# Patient Record
Sex: MALE | Race: WHITE | Employment: OTHER | URBAN - METROPOLITAN AREA
[De-identification: names, ages, dates, MRNs, and addresses within clinical notes are randomized per-mention and may not be internally consistent; named-entity substitution may affect disease eponyms.]

---

## 2018-05-19 ENCOUNTER — HOSPITAL ENCOUNTER (EMERGENCY)
Age: 38
Discharge: HOME | End: 2018-05-20
Attending: EMERGENCY MEDICINE
Payer: MEDICARE

## 2018-05-19 DIAGNOSIS — G89.29 OTHER CHRONIC PAIN: Primary | ICD-10-CM

## 2018-05-19 PROCEDURE — 99284 EMERGENCY DEPT VISIT MOD MDM: CPT | Performed by: EMERGENCY MEDICINE

## 2018-05-19 RX ORDER — TRAMADOL HYDROCHLORIDE 50 MG/1
50 TABLET ORAL
COMMUNITY
End: 2020-03-11

## 2018-05-19 RX ORDER — GABAPENTIN 600 MG/1
600 TABLET ORAL 3 TIMES DAILY
COMMUNITY
End: 2020-01-24 | Stop reason: ALTCHOICE

## 2018-05-19 RX ORDER — DULOXETIN HYDROCHLORIDE 60 MG/1
60 CAPSULE, DELAYED RELEASE ORAL 2 TIMES DAILY
COMMUNITY
End: 2020-01-24 | Stop reason: ALTCHOICE

## 2018-05-19 RX ORDER — METHOCARBAMOL 500 MG/1
500 TABLET, FILM COATED ORAL 3 TIMES DAILY
COMMUNITY
End: 2020-01-24 | Stop reason: ALTCHOICE

## 2018-05-20 VITALS
TEMPERATURE: 98 F | RESPIRATION RATE: 18 BRPM | HEART RATE: 67 BPM | OXYGEN SATURATION: 98 % | DIASTOLIC BLOOD PRESSURE: 87 MMHG | SYSTOLIC BLOOD PRESSURE: 133 MMHG

## 2018-05-20 PROCEDURE — 74011250637 HC RX REV CODE- 250/637: Performed by: EMERGENCY MEDICINE

## 2018-05-20 RX ORDER — HYDROCODONE BITARTRATE AND ACETAMINOPHEN 7.5; 325 MG/1; MG/1
TABLET ORAL
Status: DISCONTINUED
Start: 2018-05-20 | End: 2018-05-20 | Stop reason: HOSPADM

## 2018-05-20 RX ORDER — HYDROCODONE BITARTRATE AND ACETAMINOPHEN 7.5; 325 MG/1; MG/1
1 TABLET ORAL
Qty: 10 TAB | Refills: 0 | Status: SHIPPED | OUTPATIENT
Start: 2018-05-20 | End: 2020-01-24 | Stop reason: ALTCHOICE

## 2018-05-20 RX ORDER — HYDROCODONE BITARTRATE AND ACETAMINOPHEN 7.5; 325 MG/1; MG/1
1 TABLET ORAL
Status: COMPLETED | OUTPATIENT
Start: 2018-05-20 | End: 2018-05-20

## 2018-05-20 RX ADMIN — HYDROCODONE BITARTRATE AND ACETAMINOPHEN 1 TABLET: 7.5; 325 TABLET ORAL at 01:25
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
## 2018-05-20 NOTE — ED TRIAGE NOTES
Pt to er c/o having back pain. .. sts he has had back pain and has seen his surgeon and not called his surgeon since his appt. ...  Pt has brace on back

## 2018-05-20 NOTE — DISCHARGE INSTRUCTIONS
Chronic Pain: Care Instructions  Your Care Instructions    Chronic pain is pain that lasts a long time (months or even years) and may or may not have a clear cause. It is different from acute pain, which usually does have a clear cause-like an injury or illness-and gets better over time. Chronic pain:  · Lasts over time but may vary from day to day. · Does not go away despite efforts to end it. · May disrupt your sleep and lead to fatigue. · May cause depression or anxiety. · May make your muscles tense, causing more pain. · Can disrupt your work, hobbies, home life, and relationships with friends and family. Chronic pain is a very real condition. It is not just in your head. Treatment can help and usually includes several methods used together, such as medicines, physical therapy, exercise, and other treatments. Learning how to relax and changing negative thought patterns can also help you cope. Chronic pain is complex. Taking an active role in your treatment will help you better manage your pain. Tell your doctor if you have trouble dealing with your pain. You may have to try several things before you find what works best for you. Follow-up care is a key part of your treatment and safety. Be sure to make and go to all appointments, and call your doctor if you are having problems. It's also a good idea to know your test results and keep a list of the medicines you take. How can you care for yourself at home? · Pace yourself. Break up large jobs into smaller tasks. Save harder tasks for days when you have less pain, or go back and forth between hard tasks and easier ones. Take rest breaks. · Relax, and reduce stress. Relaxation techniques such as deep breathing or meditation can help. · Keep moving. Gentle, daily exercise can help reduce pain over the long run. Try low- or no-impact exercises such as walking, swimming, and stationary biking. Do stretches to stay flexible.   · Try heat, cold packs, and massage. · Get enough sleep. Chronic pain can make you tired and drain your energy. Talk with your doctor if you have trouble sleeping because of pain. · Think positive. Your thoughts can affect your pain level. Do things that you enjoy to distract yourself when you have pain instead of focusing on the pain. See a movie, read a book, listen to music, or spend time with a friend. · If you think you are depressed, talk to your doctor about treatment. · Keep a daily pain diary. Record how your moods, thoughts, sleep patterns, activities, and medicine affect your pain. You may find that your pain is worse during or after certain activities or when you are feeling a certain emotion. Having a record of your pain can help you and your doctor find the best ways to treat your pain. · Take pain medicines exactly as directed. ¨ If the doctor gave you a prescription medicine for pain, take it as prescribed. ¨ If you are not taking a prescription pain medicine, ask your doctor if you can take an over-the-counter medicine. Reducing constipation caused by pain medicine  · Include fruits, vegetables, beans, and whole grains in your diet each day. These foods are high in fiber. · Drink plenty of fluids, enough so that your urine is light yellow or clear like water. If you have kidney, heart, or liver disease and have to limit fluids, talk with your doctor before you increase the amount of fluids you drink. · If your doctor recommends it, get more exercise. Walking is a good choice. Bit by bit, increase the amount you walk every day. Try for at least 30 minutes on most days of the week. · Schedule time each day for a bowel movement. A daily routine may help. Take your time and do not strain when having a bowel movement. When should you call for help? Call your doctor now or seek immediate medical care if:  ? · Your pain gets worse or is out of control.    ? · You feel down or blue, or you do not enjoy things like you once did. You may be depressed, which is common in people with chronic pain. Depression can be treated. ? · You have vomiting or cramps for more than 2 hours. ? Watch closely for changes in your health, and be sure to contact your doctor if:  ? · You cannot sleep because of pain. ? · You are very worried or anxious about your pain. ? · You have trouble taking your pain medicine. ? · You have any concerns about your pain medicine. ? · You have trouble with bowel movements, such as:  ¨ No bowel movement in 3 days. ¨ Blood in the anal area, in your stool, or on the toilet paper. ¨ Diarrhea for more than 24 hours. Where can you learn more? Go to http://oleg-trupti.info/. Enter N004 in the search box to learn more about \"Chronic Pain: Care Instructions. \"  Current as of: October 14, 2016  Content Version: 11.4  © 4047-2138 [employer]. Care instructions adapted under license by [employer] (which disclaims liability or warranty for this information). If you have questions about a medical condition or this instruction, always ask your healthcare professional. [place] [address] any warranty or liability for your use of this information.

## 2018-05-20 NOTE — ED NOTES
Received report. Pt laying in bed. A&OX4. NAD noted. C/o back and neck pain. Denies cp or sob. Family at bedside.

## 2018-05-20 NOTE — ED NOTES
I have reviewed discharge instructions with the patient. The patient verbalized understanding. Patient left ED via Discharge Method: ambulatory to Home with mother. Opportunity for questions and clarification provided. Patient given 1 scripts. To continue your aftercare when you leave the hospital, you may receive an automated call from our care team to check in on how you are doing. This is a free service and part of our promise to provide the best care and service to meet your aftercare needs.  If you have questions, or wish to unsubscribe from this service please call [phone]. Thank you for Choosing our [facility] Emergency Department.

## 2020-01-24 PROBLEM — L81.9 DISCOLORATION OF SKIN: Status: ACTIVE | Noted: 2020-01-24

## 2020-01-24 PROBLEM — B35.4 TINEA CORPORIS: Status: ACTIVE | Noted: 2020-01-24

## 2020-01-24 PROBLEM — E66.9 CLASS 1 OBESITY WITH BODY MASS INDEX (BMI) OF 34.0 TO 34.9 IN ADULT: Status: ACTIVE | Noted: 2020-01-24

## 2020-01-24 PROBLEM — G44.329 CHRONIC POST-TRAUMATIC HEADACHE, NOT INTRACTABLE: Status: ACTIVE | Noted: 2020-01-24

## 2020-02-07 PROBLEM — K59.09 OTHER CONSTIPATION: Status: ACTIVE | Noted: 2020-02-07

## 2020-02-07 PROBLEM — E66.01 SEVERE OBESITY (HCC): Status: ACTIVE | Noted: 2020-02-07

## 2020-02-17 ENCOUNTER — HOSPITAL ENCOUNTER (OUTPATIENT)
Dept: NUTRITION | Age: 40
Discharge: HOME | End: 2020-02-17
Payer: MEDICARE

## 2020-02-17 PROCEDURE — 97802 MEDICAL NUTRITION INDIV IN: CPT
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
## 2020-03-02 ENCOUNTER — DOCUMENTATION ONLY (OUTPATIENT)
Dept: NUTRITION | Age: 40
End: 2020-03-02

## 2020-03-02 NOTE — PROGRESS NOTES
Nutrition Counseling:  Pt referred by Dr. Nelson Babcock. Pt did not show for follow-up appt today.     Pradeep Sherman Morales [address], [address] [place] [address], [address] [facility], [address] [place] [address]

## 2020-03-11 PROBLEM — Z72.0 SMOKING TRYING TO QUIT: Status: ACTIVE | Noted: 2020-03-11

## 2020-03-11 PROBLEM — K59.00 CONSTIPATION: Status: ACTIVE | Noted: 2020-02-07

## 2020-03-11 PROBLEM — Z77.090 HISTORY OF EXPOSURE TO ASBESTOS: Status: ACTIVE | Noted: 2020-03-11

## 2020-04-20 ENCOUNTER — HOSPITAL ENCOUNTER (OUTPATIENT)
Dept: GENERAL RADIOLOGY | Age: 40
Discharge: HOME | End: 2020-04-20
Attending: INTERNAL MEDICINE
Payer: MEDICARE

## 2020-04-20 DIAGNOSIS — Z77.090 HX OF ASBESTOS EXPOSURE: ICD-10-CM

## 2020-04-20 PROBLEM — Z87.891 HISTORY OF PRIOR CIGARETTE SMOKING: Status: ACTIVE | Noted: 2020-03-11

## 2020-04-20 PROBLEM — R06.02 SHORTNESS OF BREATH: Status: ACTIVE | Noted: 2020-04-20

## 2020-04-20 PROCEDURE — 71046 X-RAY EXAM CHEST 2 VIEWS: CPT

## 2020-04-21 ENCOUNTER — HOSPITAL ENCOUNTER (EMERGENCY)
Age: 40
Discharge: HOME | End: 2020-04-21
Attending: EMERGENCY MEDICINE
Payer: MEDICARE

## 2020-04-21 ENCOUNTER — APPOINTMENT (OUTPATIENT)
Dept: CT IMAGING | Age: 40
End: 2020-04-21
Attending: NURSE PRACTITIONER
Payer: MEDICARE

## 2020-04-21 VITALS
RESPIRATION RATE: 18 BRPM | OXYGEN SATURATION: 95 % | TEMPERATURE: 98.2 F | SYSTOLIC BLOOD PRESSURE: 135 MMHG | HEART RATE: 77 BPM | DIASTOLIC BLOOD PRESSURE: 81 MMHG

## 2020-04-21 DIAGNOSIS — S05.02XA CORNEAL ABRASION, LEFT, INITIAL ENCOUNTER: Primary | ICD-10-CM

## 2020-04-21 LAB
ANION GAP SERPL CALC-SCNC: 6 MMOL/L (ref 7–16)
BASOPHILS # BLD: 0.1 K/UL (ref 0–0.2)
BASOPHILS NFR BLD: 1 % (ref 0–2)
BUN SERPL-MCNC: 10 MG/DL (ref 6–23)
CALCIUM SERPL-MCNC: 9.5 MG/DL (ref 8.3–10.4)
CHLORIDE SERPL-SCNC: 107 MMOL/L (ref 98–107)
CO2 SERPL-SCNC: 30 MMOL/L (ref 21–32)
CREAT SERPL-MCNC: 0.85 MG/DL (ref 0.8–1.5)
DIFFERENTIAL METHOD BLD: NORMAL
EOSINOPHIL # BLD: 0.2 K/UL (ref 0–0.8)
EOSINOPHIL NFR BLD: 4 % (ref 0.5–7.8)
ERYTHROCYTE [DISTWIDTH] IN BLOOD BY AUTOMATED COUNT: 12.9 % (ref 11.9–14.6)
GLUCOSE SERPL-MCNC: 112 MG/DL (ref 65–100)
HCT VFR BLD AUTO: 45 % (ref 41.1–50.3)
HGB BLD-MCNC: 15 G/DL (ref 13.6–17.2)
IMM GRANULOCYTES # BLD AUTO: 0.1 K/UL (ref 0–0.5)
IMM GRANULOCYTES NFR BLD AUTO: 1 % (ref 0–5)
LYMPHOCYTES # BLD: 2 K/UL (ref 0.5–4.6)
LYMPHOCYTES NFR BLD: 31 % (ref 13–44)
MCH RBC QN AUTO: 29.5 PG (ref 26.1–32.9)
MCHC RBC AUTO-ENTMCNC: 33.3 G/DL (ref 31.4–35)
MCV RBC AUTO: 88.4 FL (ref 79.6–97.8)
MONOCYTES # BLD: 0.4 K/UL (ref 0.1–1.3)
MONOCYTES NFR BLD: 6 % (ref 4–12)
NEUTS SEG # BLD: 3.8 K/UL (ref 1.7–8.2)
NEUTS SEG NFR BLD: 58 % (ref 43–78)
NRBC # BLD: 0 K/UL (ref 0–0.2)
PLATELET # BLD AUTO: 292 K/UL (ref 150–450)
PMV BLD AUTO: 10.2 FL (ref 9.4–12.3)
POTASSIUM SERPL-SCNC: 3.9 MMOL/L (ref 3.5–5.1)
RBC # BLD AUTO: 5.09 M/UL (ref 4.23–5.6)
SODIUM SERPL-SCNC: 143 MMOL/L (ref 136–145)
WBC # BLD AUTO: 6.6 K/UL (ref 4.3–11.1)

## 2020-04-21 PROCEDURE — 85025 COMPLETE CBC W/AUTO DIFF WBC: CPT

## 2020-04-21 PROCEDURE — 70487 CT MAXILLOFACIAL W/DYE: CPT

## 2020-04-21 PROCEDURE — 99283 EMERGENCY DEPT VISIT LOW MDM: CPT

## 2020-04-21 PROCEDURE — 74011250637 HC RX REV CODE- 250/637: Performed by: NURSE PRACTITIONER

## 2020-04-21 PROCEDURE — 74011000250 HC RX REV CODE- 250: Performed by: NURSE PRACTITIONER

## 2020-04-21 PROCEDURE — 80048 BASIC METABOLIC PNL TOTAL CA: CPT

## 2020-04-21 PROCEDURE — 74011000258 HC RX REV CODE- 258: Performed by: EMERGENCY MEDICINE

## 2020-04-21 PROCEDURE — 74011636320 HC RX REV CODE- 636/320: Performed by: EMERGENCY MEDICINE

## 2020-04-21 RX ORDER — ERYTHROMYCIN 5 MG/G
OINTMENT OPHTHALMIC
Status: COMPLETED | OUTPATIENT
Start: 2020-04-21 | End: 2020-04-21

## 2020-04-21 RX ORDER — TETRACAINE HYDROCHLORIDE 5 MG/ML
1 SOLUTION OPHTHALMIC
Status: COMPLETED | OUTPATIENT
Start: 2020-04-21 | End: 2020-04-21

## 2020-04-21 RX ORDER — SODIUM CHLORIDE 0.9 % (FLUSH) 0.9 %
10 SYRINGE (ML) INJECTION
Status: COMPLETED | OUTPATIENT
Start: 2020-04-21 | End: 2020-04-21

## 2020-04-21 RX ORDER — ERYTHROMYCIN 5 MG/G
OINTMENT OPHTHALMIC
Qty: 1 TUBE | Refills: 0 | Status: SHIPPED | OUTPATIENT
Start: 2020-04-21 | End: 2020-04-28

## 2020-04-21 RX ADMIN — FLUORESCEIN SODIUM 1 STRIP: 1 STRIP OPHTHALMIC at 12:14

## 2020-04-21 RX ADMIN — TETRACAINE HYDROCHLORIDE 1 DROP: 5 SOLUTION OPHTHALMIC at 12:14

## 2020-04-21 RX ADMIN — IOPAMIDOL 100 ML: 755 INJECTION, SOLUTION INTRAVENOUS at 14:11

## 2020-04-21 RX ADMIN — SODIUM CHLORIDE 100 ML: 900 INJECTION, SOLUTION INTRAVENOUS at 14:11

## 2020-04-21 RX ADMIN — Medication 10 ML: at 14:11

## 2020-04-21 RX ADMIN — ERYTHROMYCIN: 5 OINTMENT OPHTHALMIC at 15:18

## 2020-04-21 NOTE — ED PROVIDER NOTES
37 y/o m to ed with left eye pain since cutting the grass with push mower four days ago. Thinks he got something in it then. Has had tearing since that time. 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 However redness noted is new. Last tet prior to hand/wrist surgery (wearing splint now)           Past Medical History:   Diagnosis Date    Allergies     Anxiety     Bipolar disorder ([facility] [address]     Depression     Genital warts     Psychiatric disorder     Skin abrasion     Traumatic brain injury ([facility] [address]        Past Surgical History:   Procedure Laterality Date    HX BACK SURGERY  2017    HX CRANIOTOMY      HX KNEE ARTHROSCOPY Right     HX ORTHOPAEDIC  2017    arm         Family History:   Problem Relation Age of Onset    Heart Disease Mother     Diabetes Mother     Hypertension Mother     Diabetes Father     Stroke Father        Social History     Socioeconomic History    Marital status: SINGLE     Spouse name: Not on file    Number of children: Not on file    Years of education: Not on file    Highest education level: Not on file   Occupational History    Not on file   Social Needs    Financial resource strain: Not on file    Food insecurity     Worry: Not on file     Inability: Not on file    Transportation needs     Medical: Not on file     Non-medical: Not on file   Tobacco Use    Smoking status: Former Smoker     Packs/day: 1.50     Years: 20.00     Pack years: 30.00     Types: Cigarettes     Last attempt to quit: 2018     Years since quittin.3    Smokeless tobacco: Former User     Types: Snuff, Chew   Substance and Sexual Activity    Alcohol use:  Yes    Drug use: No    Sexual activity: Not on file   Lifestyle    Physical activity     Days per week: Not on file     Minutes per session: Not on file    Stress: Not on file   Relationships    Social connections     Talks on phone: Not on file     Gets together: Not on file     Attends Jain service: Not on file     Active member of club or organization: Not on file     Attends meetings of clubs or organizations: Not on file     Relationship status: Not on file    Intimate partner violence     Fear of current or ex partner: Not on file     Emotionally abused: Not on file     Physically abused: Not on file     Forced sexual activity: Not on file   Other Topics Concern    Not on file   Social History Narrative    Not on file         ALLERGIES: Amoxicillin; Benztropine; Haldol [haloperidol lactate]; Seroquel [quetiapine]; and Risperidone    Review of Systems   Constitutional: Negative for chills and fever. HENT: Negative for facial swelling and mouth sores. Eyes: Positive for pain, discharge and redness. Respiratory: Negative for cough and shortness of breath. Cardiovascular: Negative for chest pain and palpitations. Gastrointestinal: Negative for nausea and vomiting. Genitourinary: Negative for difficulty urinating. Musculoskeletal: Negative for back pain and neck pain. Skin: Negative for color change and rash. Neurological: Negative for dizziness and weakness. Psychiatric/Behavioral: Negative for confusion and decreased concentration. Vitals:    04/21/20 1130   BP: 126/86   Pulse: 71   Resp: 16   Temp: 98.2 °F (36.8 °C)   SpO2: 95%            Physical Exam  Vitals signs and nursing note reviewed. Constitutional:       Appearance: Normal appearance. HENT:      Head: Normocephalic and atraumatic. Nose: Nose normal.      Mouth/Throat:      Mouth: Mucous membranes are moist.   Eyes:      General: Lids are normal.      Extraocular Movements: Extraocular movements intact. Conjunctiva/sclera:      Left eye: Left conjunctiva is injected. Hemorrhage present. Neck:      Musculoskeletal: Normal range of motion. Cardiovascular:      Rate and Rhythm: Normal rate. Pulmonary:      Effort: Pulmonary effort is normal.   Musculoskeletal: Normal range of motion.    Skin:     General: Skin is warm. Capillary Refill: Capillary refill takes less than 2 seconds. Neurological:      General: No focal deficit present. Mental Status: He is alert and oriented to person, place, and time. Psychiatric:         Mood and Affect: Mood normal.         Behavior: Behavior normal.         Thought Content: Thought content normal.         Judgment: Judgment normal.          MDM  Number of Diagnoses or Management Options  Diagnosis management comments: Will eval fluro stain, as well ct for fb.    1:42 PM  Still waiting ct scan  2:04 PM) pt not in room, I hope he is in ct  2:36 PM  Back from ct. Uptake of stain noted to cloudy cornea (hsx same) with tiny abrasion noted at 6 oclock. Will wait ct result       Amount and/or Complexity of Data Reviewed  Clinical lab tests: ordered  Tests in the radiology section of CPT®: ordered           Eye Stain    Date/Time: 4/21/2020 2:35 PM    Performed by: NP  Supervising provider: rekito        Corneal abrasion was present on eyelid eversion. Left eye location: 6 o'clock    Cornea is not clear. Anterior chamber is not clear. Patient tolerance: Patient tolerated the procedure well with no immediate complications  My total time at bedside, performing this procedure was 1-15 minutes.   Comments: hsx cloudy cornea  Tiny linear abrasion

## 2020-04-21 NOTE — ED NOTES
I have reviewed discharge instructions with the patient. The patient verbalized understanding. Patient left ED via Discharge Method: ambulatory to Home with self    Opportunity for questions and clarification provided. Patient given 1 scripts. No esign        To continue your aftercare when you leave the hospital, you may receive an automated call from our care team to check in on how you are doing. This is a free service and part of our promise to provide the best care and service to meet your aftercare needs.  If you have questions, or wish to unsubscribe from this service please call [phone]. Thank you for Choosing our [facility].

## 2020-04-21 NOTE — ED NOTES
Pt resting in bed, pt denies any additional needs at this time. Pt respirations even and unlabored. Pt bed locked and low. Pt updated about plan of care, aware he is awaiting a CT.

## 2020-04-21 NOTE — ED TRIAGE NOTES
Pt reprots excessive watering to let eye for a couple of days. Previous hx of eye injury which caused blindness.  NAD

## 2020-04-21 NOTE — DISCHARGE INSTRUCTIONS
Patient Education        Corneal Scratches: Care Instructions  Your Care Instructions    The cornea is the clear surface that covers the front of the eye. When a speck of dirt, a wood chip, an insect, or another object flies into your eye, it can cause a painful scratch on the cornea. Wearing contact lenses too long or rubbing your eyes can also scratch the cornea. Small scratches usually heal in a day or two. Deeper scratches may take longer. If you have had a foreign object removed from your eye or you have a corneal scratch, you will need to watch for infection and vision problems while your eye heals. Follow-up care is a key part of your treatment and safety. Be sure to make and go to all appointments, and call your doctor if you are having problems. It's also a good idea to know your test results and keep a list of the medicines you take. How can you care for yourself at home? · The doctor probably used a medicine during your exam to numb your eye. When it wears off in 30 to 60 minutes, your eye pain may come back. Take pain medicines exactly as directed. ? If the doctor gave you a prescription medicine for pain, take it as prescribed. ? If you are not taking a prescription pain medicine, ask your doctor if you can take an over-the-counter medicine. ? Do not take two or more pain medicines at the same time unless the doctor told you to. Many pain medicines have acetaminophen, which is Tylenol. Too much acetaminophen (Tylenol) can be harmful. · Do not rub your injured eye. Rubbing can make it worse. · Use the prescribed eyedrops or ointment as directed. Be sure the dropper or bottle tip is clean. To put in eyedrops or ointment:  ? Tilt your head back, and pull your lower eyelid down with one finger. ? Drop or squirt the medicine inside the lower lid. ? Close your eye for 30 to 60 seconds to let the drops or ointment move around.   ? Do not touch the ointment or dropper tip to your eyelashes or any other surface. · Do not use your contact lens in your hurt eye until your doctor says you can. Also, do not wear eye makeup until your eye has healed. · Do not drive if you have blurred vision. · Bright light may hurt. Sunglasses can help. · To prevent eye injuries in the future, wear safety glasses or goggles when you work with machines or tools, mow the lawn, or ride a bike or motorcycle. When should you call for help? Call your doctor now or seek immediate medical care if:    · You have signs of an eye infection, such as:  ? Pus or thick discharge coming from the eye.  ? Redness or swelling around the eye.  ? A fever.     · You have new or worse eye pain.     · You have vision changes.     · It feels like there is something in your eye.     · Light hurts your eye.    Watch closely for changes in your health, and be sure to contact your doctor if:    · You do not get better as expected. Where can you learn more? Go to http://oleg-trupti.info/  Enter G403 in the search box to learn more about \"Corneal Scratches: Care Instructions. \"  Current as of: December 17, 2019Content Version: 12.4  © 1025-2857 [employer]. Care instructions adapted under license by [employer] (which disclaims liability or warranty for this information). If you have questions about a medical condition or this instruction, always ask your healthcare professional. [place] [address] any warranty or liability for your use of this information. home with family    Use meds 4 times daily.   Follow with your eye doctor for continued care

## 2020-05-08 PROBLEM — R06.09 DOE (DYSPNEA ON EXERTION): Status: ACTIVE | Noted: 2020-04-20

## 2020-05-11 PROBLEM — R20.2 PARESTHESIA: Status: ACTIVE | Noted: 2020-05-11

## 2020-05-11 PROBLEM — Z79.899 ENCOUNTER FOR MEDICATION MANAGEMENT: Status: ACTIVE | Noted: 2020-05-11

## 2020-05-11 PROBLEM — R93.0: Status: ACTIVE | Noted: 2020-05-11

## 2020-05-11 PROBLEM — G44.041 INTRACTABLE CHRONIC PAROXYSMAL HEMICRANIA: Status: ACTIVE | Noted: 2020-05-11

## 2020-05-11 PROBLEM — Z98.890 STATUS POST CRANIOTOMY: Status: ACTIVE | Noted: 2020-05-11

## 2020-05-11 PROBLEM — R29.3 POSTURAL IMBALANCE: Status: ACTIVE | Noted: 2020-05-11

## 2020-05-11 PROBLEM — G43.119 INTRACTABLE MIGRAINE WITH AURA WITHOUT STATUS MIGRAINOSUS: Status: ACTIVE | Noted: 2020-05-11

## 2020-06-16 ENCOUNTER — TELEPHONE (OUTPATIENT)
Dept: NUTRITION | Age: 40
End: 2020-06-16

## 2020-06-16 NOTE — TELEPHONE ENCOUNTER
Nutrition Counseling: Contacted pt regarding referral. See notes documented in Nutrition Counseling Referral for details. No further follow-up contact from pt. Will close referral for this office.     [facility]  [phone]

## 2020-09-03 PROBLEM — G56.03 BILATERAL CARPAL TUNNEL SYNDROME: Status: ACTIVE | Noted: 2020-09-03

## 2020-10-22 PROBLEM — G44.209 TENSION TYPE HEADACHE: Status: ACTIVE | Noted: 2020-10-22

## 2020-10-22 PROBLEM — Z87.828 HISTORY OF SUBDURAL HEMATOMA (POST TRAUMATIC): Status: ACTIVE | Noted: 2020-10-22

## 2020-11-09 PROBLEM — Z91.199 NO-SHOW FOR APPOINTMENT: Status: ACTIVE | Noted: 2020-11-09

## 2020-11-09 PROBLEM — R94.01 ABNORMAL EEG: Status: ACTIVE | Noted: 2020-11-09
